# Patient Record
Sex: FEMALE | Race: WHITE | NOT HISPANIC OR LATINO | URBAN - METROPOLITAN AREA
[De-identification: names, ages, dates, MRNs, and addresses within clinical notes are randomized per-mention and may not be internally consistent; named-entity substitution may affect disease eponyms.]

---

## 2017-01-31 RX ORDER — SIMVASTATIN 20 MG/1
0 TABLET, FILM COATED ORAL
Qty: 90 | Refills: 0 | COMMUNITY
Start: 2017-01-31

## 2017-02-07 ENCOUNTER — OUTPATIENT (OUTPATIENT)
Dept: OUTPATIENT SERVICES | Facility: HOSPITAL | Age: 62
LOS: 1 days | End: 2017-02-07
Payer: COMMERCIAL

## 2017-02-07 VITALS
SYSTOLIC BLOOD PRESSURE: 110 MMHG | RESPIRATION RATE: 16 BRPM | TEMPERATURE: 98 F | WEIGHT: 195.11 LBS | HEART RATE: 72 BPM | HEIGHT: 67 IN | DIASTOLIC BLOOD PRESSURE: 70 MMHG

## 2017-02-07 DIAGNOSIS — G56.00 CARPAL TUNNEL SYNDROME, UNSPECIFIED UPPER LIMB: Chronic | ICD-10-CM

## 2017-02-07 DIAGNOSIS — S83.282D OTHER TEAR OF LATERAL MENISCUS, CURRENT INJURY, LEFT KNEE, SUBSEQUENT ENCOUNTER: ICD-10-CM

## 2017-02-07 DIAGNOSIS — Z01.818 ENCOUNTER FOR OTHER PREPROCEDURAL EXAMINATION: ICD-10-CM

## 2017-02-07 DIAGNOSIS — Z98.890 OTHER SPECIFIED POSTPROCEDURAL STATES: Chronic | ICD-10-CM

## 2017-02-07 DIAGNOSIS — Z90.710 ACQUIRED ABSENCE OF BOTH CERVIX AND UTERUS: Chronic | ICD-10-CM

## 2017-02-07 DIAGNOSIS — Z90.89 ACQUIRED ABSENCE OF OTHER ORGANS: Chronic | ICD-10-CM

## 2017-02-07 DIAGNOSIS — S83.519A SPRAIN OF ANTERIOR CRUCIATE LIGAMENT OF UNSPECIFIED KNEE, INITIAL ENCOUNTER: Chronic | ICD-10-CM

## 2017-02-07 DIAGNOSIS — Z41.1 ENCOUNTER FOR COSMETIC SURGERY: Chronic | ICD-10-CM

## 2017-02-07 LAB
ANION GAP SERPL CALC-SCNC: 12 MMOL/L — SIGNIFICANT CHANGE UP (ref 5–17)
BUN SERPL-MCNC: 11 MG/DL — SIGNIFICANT CHANGE UP (ref 7–23)
CALCIUM SERPL-MCNC: 8.8 MG/DL — SIGNIFICANT CHANGE UP (ref 8.5–10.1)
CHLORIDE SERPL-SCNC: 106 MMOL/L — SIGNIFICANT CHANGE UP (ref 96–108)
CO2 SERPL-SCNC: 24 MMOL/L — SIGNIFICANT CHANGE UP (ref 22–31)
CREAT SERPL-MCNC: 0.76 MG/DL — SIGNIFICANT CHANGE UP (ref 0.5–1.3)
GLUCOSE SERPL-MCNC: 128 MG/DL — HIGH (ref 70–99)
POTASSIUM SERPL-MCNC: 3.4 MMOL/L — LOW (ref 3.5–5.3)
POTASSIUM SERPL-SCNC: 3.4 MMOL/L — LOW (ref 3.5–5.3)
SODIUM SERPL-SCNC: 142 MMOL/L — SIGNIFICANT CHANGE UP (ref 135–145)

## 2017-02-07 PROCEDURE — G0463: CPT

## 2017-02-07 PROCEDURE — 80048 BASIC METABOLIC PNL TOTAL CA: CPT

## 2017-02-07 NOTE — H&P PST ADULT - NSANTHOSAYNRD_GEN_A_CORE
No. FRANK screening performed.  STOP BANG Legend: 0-2 = LOW Risk; 3-4 = INTERMEDIATE Risk; 5-8 = HIGH Risk

## 2017-02-07 NOTE — H&P PST ADULT - FAMILY HISTORY
Mother  Still living? Yes, Estimated age: 88  Family history of dementia, Age at diagnosis: Age Unknown  Family history of emphysema, Age at diagnosis: Age Unknown  Family history of arthritis, Age at diagnosis: Age Unknown     Father  Still living? Yes, Estimated age: 89  Family history of acute congestive heart failure, Age at diagnosis: Age Unknown  Family history of type 2 diabetes mellitus, Age at diagnosis: Age Unknown  Family history of prostate cancer in father, Age at diagnosis: Age Unknown  Family history of neuropathy, Age at diagnosis: Age Unknown     Sibling  Still living? Yes, Estimated age: Age Unknown  Family history of neuropathy, Age at diagnosis: Age Unknown

## 2017-02-07 NOTE — H&P PST ADULT - PMH
Endometrial cancer  2012  Hyperlipidemia    Other tear of lateral meniscus of left knee as current injury, subsequent encounter    Vitamin D deficiency

## 2017-02-07 NOTE — H&P PST ADULT - PROBLEM SELECTOR PLAN 1
62 yo female scheduled for Left Knee Arthroscopy on 2/10/17 with Dr Byrnes  Check labs CBC CMP on chart   BMP repeated here   Medical Clearance  Hibiclens and Preop instructions were given  patient has stopped osteo flex and fish oil   morning of surgery no medications  patient verbalizes understanding of instructions

## 2017-02-07 NOTE — H&P PST ADULT - HISTORY OF PRESENT ILLNESS
62 yo female scheduled for left knee Arthroscopy on 2/10/17 with Dr Byrnes  In May 2016 patient twisted the left knee avoiding a fall.  Patient has had  one Cortisone injection which did not help. 60 yo female scheduled for left knee Arthroscopy on 2/10/17 with Dr Byrnes.  In May 2016 patient twisted the left knee avoiding a fall.  Patient has had  one Cortisone injection which did not help.

## 2017-02-08 RX ORDER — ACETAMINOPHEN 500 MG
650 TABLET ORAL ONCE
Qty: 0 | Refills: 0 | Status: COMPLETED | OUTPATIENT
Start: 2017-02-10 | End: 2017-02-10

## 2017-02-09 ENCOUNTER — RESULT REVIEW (OUTPATIENT)
Age: 62
End: 2017-02-09

## 2017-02-10 ENCOUNTER — TRANSCRIPTION ENCOUNTER (OUTPATIENT)
Age: 62
End: 2017-02-10

## 2017-02-10 ENCOUNTER — OUTPATIENT (OUTPATIENT)
Dept: OUTPATIENT SERVICES | Facility: HOSPITAL | Age: 62
LOS: 1 days | Discharge: HOME | End: 2017-02-10
Payer: COMMERCIAL

## 2017-02-10 VITALS
HEART RATE: 71 BPM | OXYGEN SATURATION: 96 % | SYSTOLIC BLOOD PRESSURE: 118 MMHG | HEIGHT: 67 IN | RESPIRATION RATE: 16 BRPM | DIASTOLIC BLOOD PRESSURE: 88 MMHG | WEIGHT: 195.11 LBS | TEMPERATURE: 98 F

## 2017-02-10 VITALS
TEMPERATURE: 98 F | HEART RATE: 71 BPM | RESPIRATION RATE: 16 BRPM | OXYGEN SATURATION: 96 % | SYSTOLIC BLOOD PRESSURE: 118 MMHG | DIASTOLIC BLOOD PRESSURE: 72 MMHG

## 2017-02-10 DIAGNOSIS — S83.282D OTHER TEAR OF LATERAL MENISCUS, CURRENT INJURY, LEFT KNEE, SUBSEQUENT ENCOUNTER: ICD-10-CM

## 2017-02-10 DIAGNOSIS — Z90.710 ACQUIRED ABSENCE OF BOTH CERVIX AND UTERUS: Chronic | ICD-10-CM

## 2017-02-10 DIAGNOSIS — Z98.890 OTHER SPECIFIED POSTPROCEDURAL STATES: Chronic | ICD-10-CM

## 2017-02-10 DIAGNOSIS — Z01.818 ENCOUNTER FOR OTHER PREPROCEDURAL EXAMINATION: ICD-10-CM

## 2017-02-10 DIAGNOSIS — Z41.1 ENCOUNTER FOR COSMETIC SURGERY: Chronic | ICD-10-CM

## 2017-02-10 DIAGNOSIS — S83.519A SPRAIN OF ANTERIOR CRUCIATE LIGAMENT OF UNSPECIFIED KNEE, INITIAL ENCOUNTER: Chronic | ICD-10-CM

## 2017-02-10 DIAGNOSIS — G56.00 CARPAL TUNNEL SYNDROME, UNSPECIFIED UPPER LIMB: Chronic | ICD-10-CM

## 2017-02-10 DIAGNOSIS — Z90.89 ACQUIRED ABSENCE OF OTHER ORGANS: Chronic | ICD-10-CM

## 2017-02-10 PROCEDURE — 29880 ARTHRS KNE SRG MNISECTMY M&L: CPT | Mod: LT

## 2017-02-10 PROCEDURE — 88304 TISSUE EXAM BY PATHOLOGIST: CPT

## 2017-02-10 PROCEDURE — 88304 TISSUE EXAM BY PATHOLOGIST: CPT | Mod: 26

## 2017-02-10 RX ORDER — OXYCODONE HYDROCHLORIDE 5 MG/1
5 TABLET ORAL ONCE
Qty: 0 | Refills: 0 | Status: DISCONTINUED | OUTPATIENT
Start: 2017-02-10 | End: 2017-02-10

## 2017-02-10 RX ORDER — SODIUM CHLORIDE 9 MG/ML
1000 INJECTION, SOLUTION INTRAVENOUS
Qty: 0 | Refills: 0 | Status: DISCONTINUED | OUTPATIENT
Start: 2017-02-10 | End: 2017-02-10

## 2017-02-10 RX ORDER — KETOROLAC TROMETHAMINE 30 MG/ML
30 SYRINGE (ML) INJECTION ONCE
Qty: 0 | Refills: 0 | Status: DISCONTINUED | OUTPATIENT
Start: 2017-02-10 | End: 2017-02-10

## 2017-02-10 RX ORDER — CEFAZOLIN SODIUM 1 G
2000 VIAL (EA) INJECTION ONCE
Qty: 0 | Refills: 0 | Status: COMPLETED | OUTPATIENT
Start: 2017-02-10 | End: 2017-02-10

## 2017-02-10 RX ORDER — ACETAMINOPHEN 500 MG
650 TABLET ORAL ONCE
Qty: 0 | Refills: 0 | Status: DISCONTINUED | OUTPATIENT
Start: 2017-02-10 | End: 2017-02-10

## 2017-02-10 RX ORDER — HYDROMORPHONE HYDROCHLORIDE 2 MG/ML
0.5 INJECTION INTRAMUSCULAR; INTRAVENOUS; SUBCUTANEOUS
Qty: 0 | Refills: 0 | Status: DISCONTINUED | OUTPATIENT
Start: 2017-02-10 | End: 2017-02-10

## 2017-02-10 RX ORDER — ASPIRIN/CALCIUM CARB/MAGNESIUM 324 MG
1 TABLET ORAL
Qty: 14 | Refills: 0 | OUTPATIENT
Start: 2017-02-10 | End: 2017-02-24

## 2017-02-10 RX ADMIN — SODIUM CHLORIDE 75 MILLILITER(S): 9 INJECTION, SOLUTION INTRAVENOUS at 06:51

## 2017-02-10 RX ADMIN — Medication 650 MILLIGRAM(S): at 06:59

## 2017-02-10 RX ADMIN — Medication 650 MILLIGRAM(S): at 07:00

## 2017-02-10 RX ADMIN — Medication 30 MILLIGRAM(S): at 09:15

## 2017-02-10 RX ADMIN — Medication 30 MILLIGRAM(S): at 09:45

## 2017-02-10 NOTE — ASU DISCHARGE PLAN (ADULT/PEDIATRIC). - NOTIFY
Fever greater than 101/Bleeding that does not stop/Pain not relieved by Medications/Persistent Nausea and Vomiting/Numbness, color, or temperature change to extremity

## 2017-02-10 NOTE — ASU PATIENT PROFILE, ADULT - PSH
ACL (anterior cruciate ligament) tear  reconstruction 5/97  Admission for breast augmentation  2015  Carpal tunnel syndrome  1983 1985  H/O abdominal hysterectomy    H/O abdominoplasty  12/98  H/O hemorrhoidectomy  2006  S/P tonsillectomy  1982

## 2017-02-10 NOTE — BRIEF OPERATIVE NOTE - POST-OP DX
Bucket handle tear of medial meniscus of left knee, unspecified whether old or current tear, initial encounter  02/10/2017    Active  Jefe Francisco  Complex tear of lateral meniscus of left knee, unspecified whether old or current tear, initial encounter  02/10/2017    Active  Jefe Francisco

## 2017-02-10 NOTE — ASU DISCHARGE PLAN (ADULT/PEDIATRIC). - MEDICATION SUMMARY - MEDICATIONS TO TAKE
I will START or STAY ON the medications listed below when I get home from the hospital:    Osteoflex  --     -- Indication: For home med    Vicodin 5 mg-300 mg oral tablet  -- 1 tab(s) by mouth every 4-6 hours as needed for pain  -- Indication: For pain prn    aspirin 81 mg oral tablet  -- 1 tab(s) by mouth once a day for DVT PPx. Take for 2 weeks. MDD:1 tab  -- Take with food or milk.    -- Indication: For DVT PPx    SIMVASTATIN  TAB 20MG  -- Indication: For home med    Fish Oil oral capsule  --  by mouth   -- Indication: For home med    multivitamin  --     -- Indication: For home med    Vitamin D3 1000 intl units oral capsule  --  by mouth   -- Indication: For home med

## 2017-02-10 NOTE — ASU DISCHARGE PLAN (ADULT/PEDIATRIC). - SPECIAL INSTRUCTIONS
please call office for follow up appointment; please rest ice and elevate affected knee; WBAT; keep dressing clean dry intact; Aspirin 81mg for 2 weeks for DVT prophylaxis. Vicodin prescription sent to pharmacy from the office.

## 2017-02-13 LAB — SURGICAL PATHOLOGY FINAL REPORT - CH: SIGNIFICANT CHANGE UP

## 2017-02-16 DIAGNOSIS — S83.272A COMPLEX TEAR OF LATERAL MENISCUS, CURRENT INJURY, LEFT KNEE, INITIAL ENCOUNTER: ICD-10-CM

## 2017-02-16 DIAGNOSIS — M65.862 OTHER SYNOVITIS AND TENOSYNOVITIS, LEFT LOWER LEG: ICD-10-CM

## 2017-02-16 DIAGNOSIS — Z91.018 ALLERGY TO OTHER FOODS: ICD-10-CM

## 2017-02-16 DIAGNOSIS — M17.9 OSTEOARTHRITIS OF KNEE, UNSPECIFIED: ICD-10-CM

## 2017-02-16 DIAGNOSIS — M25.562 PAIN IN LEFT KNEE: ICD-10-CM
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
## 2017-02-16 DIAGNOSIS — Z91.040 LATEX ALLERGY STATUS: ICD-10-CM
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
## 2017-02-16 DIAGNOSIS — Z91.048 OTHER NONMEDICINAL SUBSTANCE ALLERGY STATUS: ICD-10-CM

## 2017-02-16 DIAGNOSIS — S83.212A BUCKET-HANDLE TEAR OF MEDIAL MENISCUS, CURRENT INJURY, LEFT KNEE, INITIAL ENCOUNTER: ICD-10-CM

## 2017-02-16 DIAGNOSIS — E78.5 HYPERLIPIDEMIA, UNSPECIFIED: ICD-10-CM

## 2017-02-16 DIAGNOSIS — E78.00 PURE HYPERCHOLESTEROLEMIA, UNSPECIFIED: ICD-10-CM
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
## 2017-03-06 ENCOUNTER — APPOINTMENT (OUTPATIENT)
Dept: INFECTIOUS DISEASE | Facility: CLINIC | Age: 62
End: 2017-03-06

## 2017-05-15 ENCOUNTER — RX RENEWAL (OUTPATIENT)
Age: 62
End: 2017-05-15

## 2017-05-15 ENCOUNTER — APPOINTMENT (OUTPATIENT)
Dept: DERMATOLOGY | Facility: CLINIC | Age: 62
End: 2017-05-15

## 2017-05-15 VITALS
HEIGHT: 67 IN | WEIGHT: 176 LBS | SYSTOLIC BLOOD PRESSURE: 130 MMHG | BODY MASS INDEX: 27.62 KG/M2 | DIASTOLIC BLOOD PRESSURE: 84 MMHG

## 2017-05-15 DIAGNOSIS — L30.9 DERMATITIS, UNSPECIFIED: ICD-10-CM

## 2017-05-15 DIAGNOSIS — L57.0 ACTINIC KERATOSIS: ICD-10-CM

## 2017-05-15 RX ORDER — ASPIRIN ENTERIC COATED TABLETS 81 MG 81 MG/1
81 TABLET, DELAYED RELEASE ORAL
Qty: 14 | Refills: 0 | Status: DISCONTINUED | COMMUNITY
Start: 2017-02-10

## 2017-05-15 RX ORDER — SIMVASTATIN 20 MG/1
20 TABLET, FILM COATED ORAL
Qty: 14 | Refills: 0 | Status: ACTIVE | COMMUNITY
Start: 2017-01-18

## 2017-06-05 ENCOUNTER — APPOINTMENT (OUTPATIENT)
Dept: DERMATOLOGY | Facility: CLINIC | Age: 62
End: 2017-06-05

## 2017-09-11 ENCOUNTER — APPOINTMENT (OUTPATIENT)
Dept: DERMATOLOGY | Facility: CLINIC | Age: 62
End: 2017-09-11

## 2017-10-02 ENCOUNTER — APPOINTMENT (OUTPATIENT)
Dept: DERMATOLOGY | Facility: CLINIC | Age: 62
End: 2017-10-02
Payer: COMMERCIAL

## 2017-10-02 VITALS — SYSTOLIC BLOOD PRESSURE: 120 MMHG | DIASTOLIC BLOOD PRESSURE: 74 MMHG

## 2017-10-02 DIAGNOSIS — I78.1 NEVUS, NON-NEOPLASTIC: ICD-10-CM

## 2017-10-02 DIAGNOSIS — L85.1 ACQUIRED KERATOSIS [KERATODERMA] PALMARIS ET PLANTARIS: ICD-10-CM

## 2017-10-02 DIAGNOSIS — Z12.83 ENCOUNTER FOR SCREENING FOR MALIGNANT NEOPLASM OF SKIN: ICD-10-CM

## 2017-10-02 DIAGNOSIS — L73.9 FOLLICULAR DISORDER, UNSPECIFIED: ICD-10-CM

## 2017-10-02 PROCEDURE — 99214 OFFICE O/P EST MOD 30 MIN: CPT | Mod: GC

## 2017-10-10 ENCOUNTER — MEDICATION RENEWAL (OUTPATIENT)
Age: 62
End: 2017-10-10

## 2017-10-18 ENCOUNTER — MEDICATION RENEWAL (OUTPATIENT)
Age: 62
End: 2017-10-18

## 2017-11-28 ENCOUNTER — OUTPATIENT (OUTPATIENT)
Dept: OUTPATIENT SERVICES | Facility: HOSPITAL | Age: 62
LOS: 1 days | End: 2017-11-28
Payer: COMMERCIAL

## 2017-11-28 VITALS
SYSTOLIC BLOOD PRESSURE: 144 MMHG | HEART RATE: 60 BPM | DIASTOLIC BLOOD PRESSURE: 95 MMHG | RESPIRATION RATE: 18 BRPM | TEMPERATURE: 98 F | WEIGHT: 192.9 LBS | OXYGEN SATURATION: 96 % | HEIGHT: 67 IN

## 2017-11-28 DIAGNOSIS — Z90.89 ACQUIRED ABSENCE OF OTHER ORGANS: Chronic | ICD-10-CM

## 2017-11-28 DIAGNOSIS — Z01.812 ENCOUNTER FOR PREPROCEDURAL LABORATORY EXAMINATION: ICD-10-CM

## 2017-11-28 DIAGNOSIS — G56.00 CARPAL TUNNEL SYNDROME, UNSPECIFIED UPPER LIMB: Chronic | ICD-10-CM

## 2017-11-28 DIAGNOSIS — Z90.710 ACQUIRED ABSENCE OF BOTH CERVIX AND UTERUS: Chronic | ICD-10-CM

## 2017-11-28 DIAGNOSIS — S83.519A SPRAIN OF ANTERIOR CRUCIATE LIGAMENT OF UNSPECIFIED KNEE, INITIAL ENCOUNTER: Chronic | ICD-10-CM

## 2017-11-28 DIAGNOSIS — Z98.890 OTHER SPECIFIED POSTPROCEDURAL STATES: Chronic | ICD-10-CM

## 2017-11-28 DIAGNOSIS — Z41.1 ENCOUNTER FOR COSMETIC SURGERY: Chronic | ICD-10-CM

## 2017-11-28 DIAGNOSIS — M25.561 PAIN IN RIGHT KNEE: ICD-10-CM

## 2017-11-28 DIAGNOSIS — S83.241D OTHER TEAR OF MEDIAL MENISCUS, CURRENT INJURY, RIGHT KNEE, SUBSEQUENT ENCOUNTER: ICD-10-CM

## 2017-11-28 LAB
ANION GAP SERPL CALC-SCNC: 6 MMOL/L — SIGNIFICANT CHANGE UP (ref 5–17)
BUN SERPL-MCNC: 16 MG/DL — SIGNIFICANT CHANGE UP (ref 7–23)
CALCIUM SERPL-MCNC: 9.4 MG/DL — SIGNIFICANT CHANGE UP (ref 8.5–10.1)
CHLORIDE SERPL-SCNC: 104 MMOL/L — SIGNIFICANT CHANGE UP (ref 96–108)
CO2 SERPL-SCNC: 31 MMOL/L — SIGNIFICANT CHANGE UP (ref 22–31)
CREAT SERPL-MCNC: 0.78 MG/DL — SIGNIFICANT CHANGE UP (ref 0.5–1.3)
GLUCOSE SERPL-MCNC: 89 MG/DL — SIGNIFICANT CHANGE UP (ref 70–99)
HCT VFR BLD CALC: 45.7 % — HIGH (ref 34.5–45)
HGB BLD-MCNC: 15 G/DL — SIGNIFICANT CHANGE UP (ref 11.5–15.5)
MCHC RBC-ENTMCNC: 28.7 PG — SIGNIFICANT CHANGE UP (ref 27–34)
MCHC RBC-ENTMCNC: 32.9 GM/DL — SIGNIFICANT CHANGE UP (ref 32–36)
MCV RBC AUTO: 87.4 FL — SIGNIFICANT CHANGE UP (ref 80–100)
PLATELET # BLD AUTO: 201 K/UL — SIGNIFICANT CHANGE UP (ref 150–400)
POTASSIUM SERPL-MCNC: 4.1 MMOL/L — SIGNIFICANT CHANGE UP (ref 3.5–5.3)
POTASSIUM SERPL-SCNC: 4.1 MMOL/L — SIGNIFICANT CHANGE UP (ref 3.5–5.3)
RBC # BLD: 5.23 M/UL — HIGH (ref 3.8–5.2)
RBC # FLD: 11.1 % — SIGNIFICANT CHANGE UP (ref 10.3–14.5)
SODIUM SERPL-SCNC: 141 MMOL/L — SIGNIFICANT CHANGE UP (ref 135–145)
WBC # BLD: 4.9 K/UL — SIGNIFICANT CHANGE UP (ref 3.8–10.5)
WBC # FLD AUTO: 4.9 K/UL — SIGNIFICANT CHANGE UP (ref 3.8–10.5)

## 2017-11-28 PROCEDURE — 93010 ELECTROCARDIOGRAM REPORT: CPT

## 2017-11-28 PROCEDURE — G0463: CPT

## 2017-11-28 PROCEDURE — 93005 ELECTROCARDIOGRAM TRACING: CPT

## 2017-11-28 PROCEDURE — 80048 BASIC METABOLIC PNL TOTAL CA: CPT

## 2017-11-28 PROCEDURE — 85027 COMPLETE CBC AUTOMATED: CPT

## 2017-11-28 NOTE — H&P PST ADULT - ASSESSMENT
This is a 61 yo F with chronic pain to right knee since 10/14. Reports going for hiking prior to onset of symptoms and noticed having pain 5-6/10 upon walking. Was seen at Drs office and MRI was done and was referred for surgery. Labs done. Instructions given

## 2017-11-28 NOTE — H&P PST ADULT - PSH
ACL (anterior cruciate ligament) tear  reconstruction 5/97  Admission for breast augmentation  2015  Carpal tunnel syndrome  1983 1985  H/O abdominal hysterectomy    H/O abdominoplasty  12/98  H/O arthroscopy of knee  left  H/O hemorrhoidectomy  2006  S/P tonsillectomy  1982

## 2017-11-28 NOTE — H&P PST ADULT - HISTORY OF PRESENT ILLNESS
This is a 63 yo F with chronic pain to right knee since 10/14. Unknown etiology. Reports going for hiking prior to onset of symptoms and noticed having pain 5-6/10 upon walking. Was seen at Drs office and MRI was done and was referred for surgery. Currently pain -free and here for pre-surgical testing. This is a 63 yo F with chronic pain to right knee since 10/14. Non-Traumatic. Reports going for hiking prior to onset of symptoms and noticed having pain 5-6/10 upon walking. Was seen at Drs office and given cortisone injection few months ago, continued to have pain and MRI was done and was referred for surgery. Currently pain -free and here for pre-surgical testing.

## 2017-11-28 NOTE — H&P PST ADULT - MUSCULOSKELETAL
details… detailed exam no joint swelling/normal/ROM intact/no joint erythema/normal strength/no joint warmth/no calf tenderness

## 2017-12-07 RX ORDER — SODIUM CHLORIDE 9 MG/ML
1000 INJECTION, SOLUTION INTRAVENOUS
Qty: 0 | Refills: 0 | Status: DISCONTINUED | OUTPATIENT
Start: 2017-12-08 | End: 2017-12-08

## 2017-12-08 ENCOUNTER — TRANSCRIPTION ENCOUNTER (OUTPATIENT)
Age: 62
End: 2017-12-08

## 2017-12-08 ENCOUNTER — RESULT REVIEW (OUTPATIENT)
Age: 62
End: 2017-12-08

## 2017-12-08 ENCOUNTER — OUTPATIENT (OUTPATIENT)
Dept: OUTPATIENT SERVICES | Facility: HOSPITAL | Age: 62
LOS: 1 days | End: 2017-12-08
Payer: COMMERCIAL

## 2017-12-08 VITALS
HEIGHT: 67 IN | RESPIRATION RATE: 16 BRPM | SYSTOLIC BLOOD PRESSURE: 116 MMHG | DIASTOLIC BLOOD PRESSURE: 66 MMHG | OXYGEN SATURATION: 95 % | HEART RATE: 68 BPM | TEMPERATURE: 98 F | WEIGHT: 190.04 LBS

## 2017-12-08 VITALS
OXYGEN SATURATION: 100 % | HEART RATE: 65 BPM | DIASTOLIC BLOOD PRESSURE: 51 MMHG | RESPIRATION RATE: 14 BRPM | SYSTOLIC BLOOD PRESSURE: 104 MMHG

## 2017-12-08 DIAGNOSIS — Z98.890 OTHER SPECIFIED POSTPROCEDURAL STATES: Chronic | ICD-10-CM

## 2017-12-08 DIAGNOSIS — Z41.1 ENCOUNTER FOR COSMETIC SURGERY: Chronic | ICD-10-CM

## 2017-12-08 DIAGNOSIS — Z90.89 ACQUIRED ABSENCE OF OTHER ORGANS: Chronic | ICD-10-CM

## 2017-12-08 DIAGNOSIS — S83.519A SPRAIN OF ANTERIOR CRUCIATE LIGAMENT OF UNSPECIFIED KNEE, INITIAL ENCOUNTER: Chronic | ICD-10-CM

## 2017-12-08 DIAGNOSIS — Z90.710 ACQUIRED ABSENCE OF BOTH CERVIX AND UTERUS: Chronic | ICD-10-CM

## 2017-12-08 DIAGNOSIS — G56.00 CARPAL TUNNEL SYNDROME, UNSPECIFIED UPPER LIMB: Chronic | ICD-10-CM

## 2017-12-08 DIAGNOSIS — S83.241D OTHER TEAR OF MEDIAL MENISCUS, CURRENT INJURY, RIGHT KNEE, SUBSEQUENT ENCOUNTER: ICD-10-CM

## 2017-12-08 PROCEDURE — 88304 TISSUE EXAM BY PATHOLOGIST: CPT

## 2017-12-08 PROCEDURE — 88304 TISSUE EXAM BY PATHOLOGIST: CPT | Mod: 26

## 2017-12-08 PROCEDURE — 29880 ARTHRS KNE SRG MNISECTMY M&L: CPT | Mod: RT

## 2017-12-08 RX ORDER — OXYCODONE HYDROCHLORIDE 5 MG/1
5 TABLET ORAL ONCE
Qty: 0 | Refills: 0 | Status: DISCONTINUED | OUTPATIENT
Start: 2017-12-08 | End: 2017-12-08

## 2017-12-08 RX ORDER — ACETAMINOPHEN 500 MG
1000 TABLET ORAL ONCE
Qty: 0 | Refills: 0 | Status: COMPLETED | OUTPATIENT
Start: 2017-12-08 | End: 2017-12-08

## 2017-12-08 RX ORDER — HYDROMORPHONE HYDROCHLORIDE 2 MG/ML
0.5 INJECTION INTRAMUSCULAR; INTRAVENOUS; SUBCUTANEOUS
Qty: 0 | Refills: 0 | Status: DISCONTINUED | OUTPATIENT
Start: 2017-12-08 | End: 2017-12-08

## 2017-12-08 RX ORDER — CEFAZOLIN SODIUM 1 G
2000 VIAL (EA) INJECTION ONCE
Qty: 0 | Refills: 0 | Status: COMPLETED | OUTPATIENT
Start: 2017-12-08 | End: 2017-12-08

## 2017-12-08 RX ORDER — SODIUM CHLORIDE 9 MG/ML
1000 INJECTION, SOLUTION INTRAVENOUS
Qty: 0 | Refills: 0 | Status: DISCONTINUED | OUTPATIENT
Start: 2017-12-08 | End: 2017-12-08

## 2017-12-08 RX ORDER — OMEGA-3 ACID ETHYL ESTERS 1 G
0 CAPSULE ORAL
Qty: 0 | Refills: 0 | COMMUNITY

## 2017-12-08 RX ORDER — ASPIRIN/CALCIUM CARB/MAGNESIUM 324 MG
1 TABLET ORAL
Qty: 30 | Refills: 0 | OUTPATIENT
Start: 2017-12-08 | End: 2018-01-07

## 2017-12-08 RX ORDER — CHOLECALCIFEROL (VITAMIN D3) 125 MCG
0 CAPSULE ORAL
Qty: 0 | Refills: 0 | COMMUNITY

## 2017-12-08 RX ADMIN — HYDROMORPHONE HYDROCHLORIDE 0.5 MILLIGRAM(S): 2 INJECTION INTRAMUSCULAR; INTRAVENOUS; SUBCUTANEOUS at 11:06

## 2017-12-08 RX ADMIN — HYDROMORPHONE HYDROCHLORIDE 0.5 MILLIGRAM(S): 2 INJECTION INTRAMUSCULAR; INTRAVENOUS; SUBCUTANEOUS at 10:56

## 2017-12-08 RX ADMIN — SODIUM CHLORIDE 75 MILLILITER(S): 9 INJECTION, SOLUTION INTRAVENOUS at 08:25

## 2017-12-08 NOTE — BRIEF OPERATIVE NOTE - OPERATION/FINDINGS
right knee arthroscopy, medial meniscus tear, chondromalacia, discoid lateral meniscus, partial medial menisectomy, chondroplasty, debdridement, saucerization of lateral meniscus  see op report

## 2017-12-08 NOTE — ASU DISCHARGE PLAN (ADULT/PEDIATRIC). - NOTIFY
Swelling that continues/Bleeding that does not stop/Numbness, color, or temperature change to extremity/Fever greater than 101 Swelling that continues/Numbness, tingling/Numbness, color, or temperature change to extremity/Bleeding that does not stop/Pain not relieved by Medications/Fever greater than 101

## 2017-12-08 NOTE — BRIEF OPERATIVE NOTE - PROCEDURE
<<-----Click on this checkbox to enter Procedure Knee arthroscopy, right  12/08/2017    Active  [facility]

## 2017-12-08 NOTE — ASU DISCHARGE PLAN (ADULT/PEDIATRIC). - MEDICATION SUMMARY - MEDICATIONS TO TAKE
I will START or STAY ON the medications listed below when I get home from the hospital:    Osteoflex  --     -- Indication: For home med    aspirin 81 mg oral tablet  -- 1 tab(s) by mouth once a day MDD:1. do not skip doses  -- Take with food or milk.    -- Indication: For dvt ppx    SIMVASTATIN  TAB 20MG  -- Indication: For home med    Fish Oil oral capsule  --  by mouth   -- Indication: For home med    multivitamin  --     -- Indication: For home med    Vitamin D3 1000 intl units oral capsule  --  by mouth   -- Indication: For home med

## 2017-12-08 NOTE — ASU DISCHARGE PLAN (ADULT/PEDIATRIC). - ASU FOLLOWUP
911 or go to the nearest Emergency Room 911 or go to the nearest Emergency Room/[phone] ask for nursing supervisor

## 2017-12-11 LAB — SURGICAL PATHOLOGY FINAL REPORT - CH: SIGNIFICANT CHANGE UP

## 2018-09-04 NOTE — H&P PST ADULT - PRIMARY CARE PROVIDER
----- Message from Clari Wren sent at 9/4/2018  4:05 PM EDT -----  Contact: [phone]  Pt has some swelling from after her surgery .  
Pt called stating she has a fluid filled pouch under left breast where she previously had surgery. States area is painful and swollen and has been there for about 2 weeks. Denies fever. Reviewed with Dr. Valenzuela. Per Dr. Valenzuela, instructed pt to contact surgeon about this. Pt v/u.   
Dr Reyes

## 2019-10-01 PROBLEM — E78.5 HYPERLIPIDEMIA, UNSPECIFIED: Chronic | Status: ACTIVE | Noted: 2017-02-07

## 2019-10-01 PROBLEM — S83.282D OTHER TEAR OF LATERAL MENISCUS, CURRENT INJURY, LEFT KNEE, SUBSEQUENT ENCOUNTER: Chronic | Status: ACTIVE | Noted: 2017-02-07

## 2019-10-01 PROBLEM — E55.9 VITAMIN D DEFICIENCY, UNSPECIFIED: Chronic | Status: ACTIVE | Noted: 2017-02-07

## 2019-10-01 PROBLEM — C54.1 MALIGNANT NEOPLASM OF ENDOMETRIUM: Chronic | Status: ACTIVE | Noted: 2017-02-07

## 2019-11-02 NOTE — ASU PATIENT PROFILE, ADULT - MEDICATION HERBAL REMEDIES, PROFILE
**ATTENDING ADDENDUM (Dr. Marvin Ross): CODE STROKE ACTIVATION called at triage for right-sided facial droop. NO evidence of cerebrovascular accident or transient ischemic attack at this time. Evaluated by me at triage desk; NO evidence of motor weakness except for CN 7 (facial nerve) palsy. Agree with discontinuation of CODE STROKE ACTIVATION at this time. NO drift, NO cerebellar findings, NO focal or generalized weakness except in distribution of right facial nerve. Will continue to observe and monitor closely. Anticipatory guidance provided to patient and family at time of initial evaluation. no **ATTENDING ADDENDUM (Dr. Marvin Ross): patient serially evaluated throughout ED course. NO acute deterioration up to this time in the ED. NO evidence of acute cerebrovascular accident, transient ischemic attack, atrial fibrillation, arrhythmia, mass/tumor, serious bacterial infection or sepsis/severe sepsis or other worrisome etiology for clinically-diagnosed Bell's palsy. NO indication for emergent Stroke Team or Neurology evaluation at this time. Extensive anticipatory guidance provided to patient +/or family member(s). Agree with discharge home with close outpatient followup with ophthalmology and neurology. Agree with medications as prescribed: valacyclovir, tears naturale, lacri-lube, and prednisone taper.

## 2022-06-09 ENCOUNTER — APPOINTMENT (OUTPATIENT)
Dept: ORTHOPEDIC SURGERY | Facility: CLINIC | Age: 67
End: 2022-06-09
Payer: MEDICARE

## 2022-06-09 ENCOUNTER — APPOINTMENT (OUTPATIENT)
Dept: ORTHOPEDIC SURGERY | Facility: CLINIC | Age: 67
End: 2022-06-09

## 2022-06-09 VITALS — HEIGHT: 67 IN | WEIGHT: 176 LBS | BODY MASS INDEX: 27.62 KG/M2

## 2022-06-09 DIAGNOSIS — M71.22 SYNOVIAL CYST OF POPLITEAL SPACE [BAKER], LEFT KNEE: ICD-10-CM

## 2022-06-09 DIAGNOSIS — M17.12 UNILATERAL PRIMARY OSTEOARTHRITIS, LEFT KNEE: ICD-10-CM

## 2022-06-09 PROCEDURE — 73562 X-RAY EXAM OF KNEE 3: CPT | Mod: LT

## 2022-06-09 PROCEDURE — 99203 OFFICE O/P NEW LOW 30 MIN: CPT

## 2022-06-09 RX ORDER — BETAMETHASONE DIPROPIONATE 0.5 MG/G
0.05 OINTMENT TOPICAL
Qty: 1 | Refills: 3 | Status: COMPLETED | COMMUNITY
Start: 2017-05-15 | End: 2022-06-09

## 2022-06-09 RX ORDER — AMOXICILLIN AND CLAVULANATE POTASSIUM 875; 125 MG/1; MG/1
875-125 TABLET, COATED ORAL
Qty: 20 | Refills: 0 | Status: COMPLETED | COMMUNITY
Start: 2017-04-12 | End: 2022-06-09

## 2022-06-09 RX ORDER — CLINDAMYCIN PHOSPHATE 10 MG/ML
1 SOLUTION TOPICAL TWICE DAILY
Qty: 1 | Refills: 2 | Status: COMPLETED | COMMUNITY
Start: 2017-10-02 | End: 2022-06-09

## 2022-06-09 RX ORDER — METHYLPREDNISOLONE 4 MG/1
4 TABLET ORAL
Qty: 21 | Refills: 0 | Status: COMPLETED | COMMUNITY
Start: 2017-04-21 | End: 2022-06-09

## 2022-06-09 RX ORDER — HALOBETASOL PROPIONATE 0.5 MG/G
0.05 CREAM TOPICAL TWICE DAILY
Qty: 60 | Refills: 0 | Status: COMPLETED | COMMUNITY
Start: 2017-05-15 | End: 2022-06-09

## 2022-06-09 RX ORDER — LEVOFLOXACIN 500 MG/1
500 TABLET, FILM COATED ORAL
Qty: 10 | Refills: 0 | Status: COMPLETED | COMMUNITY
Start: 2017-04-18 | End: 2022-06-09

## 2022-06-09 RX ORDER — ADAPALENE AND BENZOYL PEROXIDE 1; 25 MG/G; MG/G
0.1-2.5 GEL TOPICAL
Qty: 1 | Refills: 2 | Status: COMPLETED | COMMUNITY
Start: 2017-10-18 | End: 2022-06-09

## 2022-06-09 NOTE — REASON FOR VISIT
[FreeTextEntry2] : Mild occasional pain left knee with swelling left posterior knee over the past several months

## 2022-06-09 NOTE — DISCUSSION/SUMMARY
[de-identified] : I discussed possible aspiration of popliteal cyst and injection of cortisone.  Risks benefits and the alternatives were discussed.  She says she is not that symptomatic and does not want to do this.  She will notify me if she has any increased swelling or develops and knee increased pain\par To monitor for any lower leg congestion, swelling or pain in her calf and to notify me immediately.  I did explain to her that a DVT can be potentially life-threatening if this develops\par I discussed possible Euflexxa injections for her left knee and gave her a pamphlet. I discussed trying this if she has any increased knee pain  Risks benefits and the alternatives were discussed.\par Continue Osteo Bi-Flex\par \par Impression:\par Moderate to severe osteoarthritis left knee/popliteal cyst\par

## 2022-06-09 NOTE — HISTORY OF PRESENT ILLNESS
[Gradual] : gradual [2] : 2 [Dull/Aching] : dull/aching [Intermittent] : intermittent [Leisure] : leisure [Rest] : rest [Walking] : walking [Full time] : Work status: full time [de-identified] : Patient is a 66-year-old female with history of ACL reconstruction with allograft by Dr. Garfield Huffman may 1997.  She is very active.  She hikes on a regular basis.  Over the past several months she's had mild occasional pain in her left knee with activities.  She has had swelling in her left posterior knee region as well over the past several months.  Mild occasional pain in this region.  No calf pain or swelling.  She doesn't take medications.  She takes Osteo Bi-Flex [] : Post Surgical Visit: no [de-identified] : 4/26/2022 [de-identified] : Dr. Azar  [de-identified] : MRI [de-identified] : Perla Rutledge

## 2022-06-09 NOTE — PHYSICAL EXAM
[Normal Mood and Affect] : normal mood and affect [Able to Communicate] : able to communicate [Well Developed] : well developed [Well Nourished] : well nourished [Left] : left knee [5___] : quadriceps 5[unfilled]/5 [FreeTextEntry3] : Mild swelling popliteal region [FreeTextEntry8] : Slight tenderness popliteal region with palpable no mass and mild fluctuance [FreeTextEntry9] : Reviewed and interpreted.  Left knee AP standing, lateral, sunrise views-moderate to severe degenerative changes with narrowing the medial compartment on AP standing view, spurring patellofemoral joint.  Status post ACL reconstruction with interference screws distal femur and proximal tibia.  Staple proximal tibia [TWNoteComboBox7] : flexion 125 degrees [de-identified] : extension 0 degrees [] : no calf tenderness [2+] : dorsalis pedis pulse: 2+

## 2022-06-09 NOTE — DATA REVIEWED
[MRI] : MRI [Left] : left [Knee] : knee [I independently reviewed and interpreted images and report] : I independently reviewed and interpreted images and report [FreeTextEntry1] : MRI left knee done at [facility] imaging 5/4/22 was reviewed.  There are tricompartmental degenerative changes.  Posteromedial popliteal cyst reported as measuring 4.7 x 3.8 x a 0.1 cm.  ACL graft is torn.  Degenerative tears medial lateral meniscus.

## 2022-06-29 ENCOUNTER — APPOINTMENT (OUTPATIENT)
Dept: ORTHOPEDIC SURGERY | Facility: CLINIC | Age: 67
End: 2022-06-29

## 2022-06-29 VITALS — WEIGHT: 176 LBS | BODY MASS INDEX: 27.62 KG/M2 | HEIGHT: 67 IN

## 2022-06-29 DIAGNOSIS — S63.619A UNSPECIFIED SPRAIN OF UNSPECIFIED FINGER, INITIAL ENCOUNTER: ICD-10-CM

## 2022-06-29 DIAGNOSIS — S63.637A SPRAIN OF INTERPHALANGEAL JOINT OF LEFT LITTLE FINGER, INITIAL ENCOUNTER: ICD-10-CM

## 2022-06-29 PROCEDURE — 73140 X-RAY EXAM OF FINGER(S): CPT

## 2022-06-29 NOTE — HISTORY OF PRESENT ILLNESS
[Sudden] : sudden [5] : 5 [0] : 0 [Dull/Aching] : dull/aching [Intermittent] : intermittent [Rest] : rest [Full time] : Work status: full time [de-identified] : Date of injury June 18, 2022.  Patient is a 66-year-old right-hand-dominant female who jammed her left fifth finger when she was hiking.  She does not recall a pop or snap.  Since that time she has moderate pain and stiffness in her finger with movement.  Difficulty making a closed fist.  Pain is mostly around her PIP joint [] : Post Surgical Visit: no [de-identified] : GRIPPING/BENDING [de-identified] : Perla

## 2022-06-29 NOTE — DISCUSSION/SUMMARY
[de-identified] : The patient will zack tape fourth and fifth fingers when out of the house for protection\par She was instructed in range of motion exercises to do on a regular basis\par Ice p.r.n.\par Tylenol p.r.n.\par She will avoid irritating activities\par \par Impression:\par Sprain left fifth finger June 18, 2022\par Osteoarthritis left fifth finger

## 2022-06-29 NOTE — IMAGING
[de-identified] : Left hand\par There is mild diffuse swelling of the left fifth finger compared to the right side\par Minimal tenderness over the DIP joint.  Moderate tenderness PIP joint\par No tenderness over the MP joint\par The patient has difficulty making a clenched fist secondary to pain and swelling in her fifth finger\par Rotational alignment of the finger is normal\par Sensation intact\par Ligaments are stable\par Radial pulse 2+ [Left] : left fingers [FreeTextEntry1] : Reviewed interpreted.  Left fifth finger AP, lateral and obliques-moderate to severe degenerative changes of the DIP joint.  Moderate degenerative changes of the PIP joint.  No fractures are noted

## 2022-07-14 ENCOUNTER — APPOINTMENT (OUTPATIENT)
Dept: ORTHOPEDIC SURGERY | Facility: CLINIC | Age: 67
End: 2022-07-14

## 2022-07-14 VITALS — BODY MASS INDEX: 27.62 KG/M2 | HEIGHT: 67 IN | WEIGHT: 176 LBS

## 2022-07-14 DIAGNOSIS — S63.637D SPRAIN OF INTERPHALANGEAL JOINT OF LEFT LITTLE FINGER, SUBSEQUENT ENCOUNTER: ICD-10-CM

## 2022-07-14 DIAGNOSIS — M19.042 PRIMARY OSTEOARTHRITIS, LEFT HAND: ICD-10-CM

## 2022-07-14 PROCEDURE — 99213 OFFICE O/P EST LOW 20 MIN: CPT

## 2022-07-14 PROCEDURE — 73140 X-RAY EXAM OF FINGER(S): CPT | Mod: LT

## 2022-07-14 NOTE — HISTORY OF PRESENT ILLNESS
[Gradual] : gradual [5] : 5 [1] : 2 [Dull/Aching] : dull/aching [Intermittent] : intermittent [Rest] : rest [Full time] : Work status: full time [de-identified] : Date of injury June 18, 2022\par The patient is almost 1 month status post injury left fifth finger.  She has mild pain and stiffness.  Difficulty making a close fist.  She has been doing home exercises.  Keeping her fingers zack taped p.r.n. [] : Post Surgical Visit: no [de-identified] : Perla

## 2022-07-14 NOTE — DISCUSSION/SUMMARY
[de-identified] : The patient will continue zack tape fourth and fifth fingers when out of the house for protection.  I cautioned her regarding risk of injury and if there is a nondisplaced fracture, there may be weakness resulting in fracture displacement which would require ORIF\par Continue range of motion exercises on a regular basis\par I did explain to her that it may take several months for the stiffness and swelling to subside\par Ice p.r.n.\par Tylenol p.r.n.\par She will avoid irritating activities\par She will return in 2 weeks for followup in [facility] LEFT FIFTH FINGER\par \par Impression:\par Sprain left fifth finger June 18, 2022\par Osteoarthritis left fifth finger, possible nondisplaced fracture proximal phalanx neck 95.3

## 2022-07-26 NOTE — ASU DISCHARGE PLAN (ADULT/PEDIATRIC). - I HAVE READ AND UNDERSTAND THE ABOVE INSTRUCTIONS AND I UNDERSTAND IT IS IMPORTANT TO FOLLOW THESE INSTRUCTIONS
[de-identified] : Patient referred by Dr. Nelson for evaluation of primary hyperparathyroidism. Patient reports over a 4 year history of elevated calcium.  Patient reports hypertension, reflux, fatigue and worsening osteopenia. Denies kidney stones, increased thirst, fracture, bone pain, dysphagia, change in voice or radiation exposure. TSH 1.2, calcium 10.6, , vitamin D 41. Bone density August 2019: Spine T. -0.4, hip and -2.4, wrist T. -2.0. Patient reports history of thyroid nodules, biopsied 3 years ago benign. Followup ultrasound May 2019 stable. Results not available.\par 8/12/20202 left superior and right inferior parathyroidectomy. denies dysphagia, hoarseness or parathesias. pathology hypercellular parathyroid. currently on calcium 1200 and vit D 600 daily.  thyroid US 1/16/21 with [facility], no comparison with largest 1.5 cm. f/u US 6/2021 and 6/2022  stable nodules.  biopsy 2014 reports reviewed.   continues on 2 calcium and vit D daily .  denies recent illness.  I have reviewed all old and new data and available images.  
Statement Selected

## 2022-07-28 ENCOUNTER — APPOINTMENT (OUTPATIENT)
Dept: ORTHOPEDIC SURGERY | Facility: CLINIC | Age: 67
End: 2022-07-28

## 2022-12-19 NOTE — ASU PREOP CHECKLIST - INTERNAL PROSTHESES
yes(specify)/Left knee hardware Topical Retinoid counseling:  Patient advised to apply a pea-sized amount only at bedtime and wait 30 minutes after washing their face before applying.  If too drying, patient may add a non-comedogenic moisturizer. The patient verbalized understanding of the proper use and possible adverse effects of retinoids.  All of the patient's questions and concerns were addressed.

## 2022-12-29 NOTE — ASU PREOP CHECKLIST - BSA (M2)
GI CONSULTATION NOTE  Samir Grover NP  [phone] NP in-hospital cell phone M-F until 4:30  After 5pm or on weekends, please call  for physician on call    NAME: Jovi Kirkpatrick   :  1944   MRN:  [id number]   Attending:  Dr. Salvador Engle  Primary GI:  Dr. Cathi Pro   Date/Time:  2022 9:43 AM  Assessment:   Acute on chronic anemia   FOBT +  Hgb 4.2  Gastric bx from 2022 suggestive of portal hypertensive gastropathy and small intestinal AVMS, could consider TIPS if fails conservative measures  Hx cirrhosis secondary to BRADY, compensated   Had liver bx at [facility]   BRADY, moderate fibrosis with bridging, stage III  Platelets 55     GI History:  2022 EGD: normal esophagus, edematous appearance of stomach, antrum with oozing foci and areas of ulceration and inflammation. Bx portal hypertensive gastropathy vs GAVE. Two small nonbleeding AVM in distal duodenum and jejunum s/p APC. 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 Gabriel Galo; obtain consent  Details and risks of the procedure to include (but not limited to) anesthesia, bleeding, infection, and perforation were discussed.  Patient understands and is in agreement with the plan  NPO after midnight; GI lite diet for now  Serial H&H; goal for hgb >7.0  Monitor for s/s of bleeding; transfuse as clinically indicated  Continue PPI  Abdominal US to assess liver   PT/INR ordered   Symptomatic care per primary team  Plan discussed with Dr. Gabriel Galo    Subjective:     HISTORY OF PRESENT ILLNESS:     Nella Krabbe is an 69 y.o.  female who we are asked to see for complaint of acute on chronic anemia. Patient presented to the ED with complaints of SOB. She notes she became having short of breath yesterday and worsening today. She notes this is the marker for when she is anemic. Notably she has a long history of anemia and has received over 14 units of blood in the past for chronic GI bleeds. She does follow-up with GI as noted to have GAVE. She notes that there is nothing to do for this at her last GI appointment note she would just have chronic bleeding all the time. She does get iron infusions follows up with nephrology for mild CKD. She was getting an iron infusion yesterday in the outpatient labs and she was noted to have a hemoglobin of 6 and referred to the ER for evaluation. She does get short of breath when she stands up as well as dizziness. She denies any blood in her stool. Is been eating and drinking normally but occasionally has some dry heaving and gagging but not actively vomiting.      Past Medical History:   Diagnosis Date    Abnormal nuclear stress test 10/04/2021    Anemia     Angina at rest [facility]) 10/04/2021    Arthritis     KNEE,gout    Bundle branch block     Endocrine disease     HYPOTHYROIDISM    Fracture     Left distal femur (age 13 - pt fell)    Fracture     Left tibia 1990 (pt fell)    Fracture     Right wrist fractured 2 times (pt fell)    GERD (gastroesophageal reflux disease)     High cholesterol     Hypertension     Hypoglycemia     \"my body produces too much insulin\"    Liver disease     BRADY    Nausea & vomiting     when had gallbladder out    Osteoporosis     Other ill-defined conditions(799.89)     edema legs    S/P cardiac cath 10/04/2021    10/4/2021 nonobstructive disease    Spinal stenosis     Thyroid disease       Past Surgical History:   Procedure Laterality Date    COLONOSCOPY N/A 7/13/2021    COLONOSCOPY performed by Yaya Hutchinson, MD Der[facility] at [facility] ENDOSCOPY    COLONOSCOPY N/A 8/8/2022    COLONOSCOPY performed by Jo Ann Landis MD at [place] [address] [address]  2/11/2015         COLONOSCOPY,REMV LESN,SNARE  7/13/2021         COLONOSCPY,FLEX,W/ [address] INJECT  7/13/2021         COLORECTAL SCRN; HI RISK IND  2/11/2015         HX CHOLECYSTECTOMY      HX HYSTERECTOMY      HX ORTHOPAEDIC Left     leg surgery - plates, screws, wires, pins in place    HX UROLOGICAL      PESSURY    MN ABDOMEN SURGERY PROC UNLISTED      liver biopsy--BRADY and fibrosis    SB ENDOSCOPY,W/CONTROL,BLEEDING  11/16/2022    SMALL BOWEL ENDOSCOPY,ABLATE LESN  11/16/2022    SMALL BOWEL ENDOSCOPY,BIOPSY  11/16/2022    UPPER GI ENDOSCOPY,BIOPSY  11/17/2015          Social History     Tobacco Use    Smoking status: Never    Smokeless tobacco: Never   Substance Use Topics    Alcohol use: No      Family History   Problem Relation Age of Onset    Diabetes Mother     Heart Disease Mother     Emphysema Father     No Known Problems Brother     Stroke Maternal Grandmother     No Known Problems Maternal Grandfather     No Known Problems Paternal Grandmother     No Known Problems Paternal Grandfather       Allergies   Allergen Reactions    Gabapentin Other (comments)     Suicidal ideation    Metoprolol Rash    Celebrex [Celecoxib] Hives    Eliquis [Apixaban] Other (comments)     Caused excessive anemia    Pcn [Penicillins] Unknown (comments)     Can't remember, was a long time    Sulfa (Sulfonamide Antibiotics) Hives      Prior to Admission medications    Medication Sig Start Date End Date Taking? Authorizing Provider   allopurinoL (ZYLOPRIM) 100 mg tablet Take 100 mg by mouth daily.    Yes Provider, Historical   atorvastatin (LIPITOR) 20 mg tablet TAKE 1 TABLET BY MOUTH AT BEDTIME 12/19/22  Yes Rene Colón MD   ezetimibe (ZETIA) 10 mg tablet TAKE 1 TABLET BY MOUTH EVERY DAY 12/19/22  Yes Rene Colón MD   furosemide (LASIX) 80 mg tablet Take 0.5 Tablets by mouth daily. 11/18/22  Yes Leary Essex, NP   pantoprazole (PROTONIX) 40 mg tablet Take 1 Tablet by mouth Before breakfast and dinner. 11/18/22  Yes Leary Essex, NP   metOLazone (ZAROXOLYN) 2.5 mg tablet Take 1 Tablet by mouth every other day. 10/18/22  Yes Brenda Jerry MD   amLODIPine (NORVASC) 10 mg tablet Take 1 Tablet by mouth daily. 9/24/22  Yes Brenda Jerry MD   pantoprazole (PROTONIX) 40 mg tablet Take 1 Tablet by mouth Before breakfast and dinner. 8/17/22  Yes Brenda Jerry MD   nystatin (MYCOSTATIN) powder Apply  to affected area two (2) times a day. 8/8/22  Yes Nadine Brink MD   fluticasone propionate [facility]) 50 mcg/actuation nasal spray 2 Sprays by Both Nostrils route daily. Administer to right and left nostril. 6/24/22  Yes Brenda Jerry MD   levothyroxine (SYNTHROID) 150 mcg tablet Take 1 Tablet by mouth Daily (before breakfast). 6/15/22  Yes Brenda Jerry MD   lidocaine (LIDODERM) 5 % Apply patch to the affected area for 12 hours a day and remove for 12 hours a day. 12/7/21  Yes Brenda Jerry MD   ketoconazole (NIZORAL) 2 % topical cream Apply  to affected area daily as needed. 8/23/19  Yes Provider, Historical   Biotin 2,500 mcg cap Take  by mouth. Yes Provider, Historical   loratadine (CLARITIN) 10 mg tablet TAKE 1 TABLET BY MOUTH EVERY DAY 12/23/22   Brenda Jerry MD   benzocaine-menthoL (CHLORASEPTIC MAX) 15-10 mg lozg lozenge Take 1 Lozenge by mouth as needed for Sore throat. 11/18/22   Leary Essex, NP   levalbuterol tartrate [facility]) 45 mcg/actuation inhaler Take 2 Puffs by inhalation every six (6) hours as needed for Wheezing or Shortness of Breath. Provider, Historical   alendronate (FOSAMAX) 70 mg tablet alendronate 70 mg tablet   Take 1 tablet every week by oral route. Provider, Historical   losartan (COZAAR) 25 mg tablet Take 25 mg by mouth daily.  10/18/22   Provider, Historical   potassium chloride SA (MICRO-K) 10 mEq capsule Take 2 Capsules by mouth daily. 9/24/22   Chicho Thompson MD   icosapent ethyL (VASCEPA) 1 gram capsule Take 2 Capsules by mouth two (2) times daily (with meals). 2/14/22   Chicho Thompson MD   polyethylene glycol [facility]) 17 gram/dose powder Take 17 g by mouth daily. 4/6/20   Chicho Thompson MD   L GASSERI/B BIFIDUM/B LONGUM ([employer] PO) Take 1 Tab by mouth daily. Provider, Historical   vitamin E (AQUA GEMS) 400 unit capsule Take 800 Units by mouth two (2) times a day. Provider, Historical   cholecalciferol, vitamin D3, 50 mcg (2,000 unit) tab Take 1 Tab by mouth daily. Other, MD Janette   MULTIVITAMIN WITH MINERALS (ONE-A-DAY 50 PLUS PO) Take 1 Tab by mouth daily.     Provider, Historical       Patient Active Problem List   Diagnosis Code    Gout M10.9    Hypothyroidism E03.9    Osteoporosis M81.0    Anxiety F41.9    Leg edema R60.0    RSD lower limb G90.529    Fatty liver K76.0    Pure hypercholesterolemia E78.00    Colon polyp K63.5    Bifascicular block I45.2    HTN (hypertension) I10    Thrombocytopenia (HCC) D69.6    Prediabetes R73.03    DDD (degenerative disc disease), lumbar M51.36    Controlled type 2 diabetes mellitus without complication (HCC) S10.8    Severe obesity (HCC) E66.01    Cellulitis of left lower leg L03.116    MICHELLE (acute kidney injury) ([facility] [address] N17.9    Nonrheumatic aortic valve stenosis I35.0    Pulmonary hypertension (HCC) I27.20    Abnormal nuclear stress test R94.39    Angina at rest [facility]) I20.8    S/P cardiac cath Z98.890    Type 2 diabetes mellitus with chronic kidney disease (HCC) E11.22    Stage 3a chronic kidney disease (HCC) N18.31    PAF (paroxysmal atrial fibrillation) (HCC) I48.0    SSS (sick sinus syndrome) (HCC) I49.5    Anemia D64.9    Acute anemia D64.9    Iron deficiency anemia due to chronic blood loss D50.0    Acute blood loss anemia D62    Blood loss anemia D50.0    GAVE (gastric antral vascular ectasia) K31.819       REVIEW OF SYSTEMS:    Constitutional: negative fever, negative chills, negative weight loss  Eyes:   negative visual changes  ENT:   negative sore throat, tongue or lip swelling   Respiratory:  negative cough, negative dyspnea  Cards:  negative for chest pain, palpitations, lower extremity edema  GI:   See HPI  :  negative for frequency, dysuria  Integument:  negative for rash and pruritus  Heme:  negative for easy bruising and gum/nose bleeding  Musculoskel: negative for myalgias,  back pain and muscle weakness  Neuro: negative for headaches, dizziness, vertigo  Psych: negative for feelings of anxiety, depression     Objective:   VITALS:    Visit Vitals  BP 97/65   Pulse 63   Temp 98.3 °F (36.8 °C)   Resp 18   Ht 4' 11\" (1.499 m)   Wt 93 kg (205 lb)   SpO2 95%   BMI 41.40 kg/m²       PHYSICAL EXAM:   General:          Pleasant elderly  female. NAD    Head:               Normocephalic, without obvious abnormality, atraumatic. Eyes:               Conjunctivae clear and pale, anicteric sclerae. Pupils are equal  Nose:               Nares normal. No drainage or sinus tenderness. Throat:             Lips, mucosa, and tongue normal.  No Thrush  Neck:               Supple, symmetrical,  no adenopathy, thyroid: non tender  Back:               Symmetric,  No CVA tenderness. Lungs:             CTA bilaterally. No wheezing/rhonchi/rales. Chest wall:      No tenderness or deformity. No Accessory muscle use. Heart:              Regular rate and rhythm,  no murmur, rub or gallop. Abdomen:        Soft, non-tender. Not distended. Bowel sounds normal. No masses  Extremities:     Atraumatic, No cyanosis. No edema. No clubbing  Skin:                Texture, turgor normal. No rashes/lesions/jaundice  Psych:             Good insight. Not depressed. Not anxious or agitated. Neurologic:      EOMs intact. No facial asymmetry. No aphasia or slurred speech. A/O X 3.      LAB DATA REVIEWED:    Recent Results (from the past 24 hour(s))   TYPE & SCREEN    Collection Time: 12/28/22  4:46 PM   Result Value Ref Range    Crossmatch Expiration 12/31/2022,2359     ABO/Rh(D) O POSITIVE     Antibody screen POS     Antibody ID NO ADDITIONAL ANTIBODIES DETECTED     Comment       Previously identified Anti Jk(b) and Warm Autoantibodies    TRUMAN Poly POS     TRUMAN IgG POS     ANTIBODY ELUTED ALL CELLS POSITIVE-ELUATE,     TRUMAN C3b/C3d POS     Unit number [id number]     Blood component type RC LR     Unit division 00     Status of unit ALLOCATED     Crossmatch result LEAST INCOMPATIBLE     ANTIGEN/ANTIBODY INFO C NEGATIVE,  THERON NEGATIVE,  Jk(B) NEGATIVE,       Unit number [id number]     Blood component type RC LR,1     Unit division 00     Status of unit ALLOCATED     Crossmatch result LEAST INCOMPATIBLE     ANTIGEN/ANTIBODY INFO C NEGATIVE,  THERON NEGATIVE,  Jk(B) NEGATIVE,       Unit number [id number]     Blood component type RC LR,2     Unit division 00     Status of unit ISSUED     Crossmatch result LEAST INCOMPATIBLE     ANTIGEN/ANTIBODY INFO C NEGATIVE,  THERON NEGATIVE,  Jk(B) NEGATIVE,      CBC WITH AUTOMATED DIFF    Collection Time: 12/28/22  4:46 PM   Result Value Ref Range    WBC 3.2 (L) 3.6 - 11.0 K/uL    RBC 1.73 (L) 3.80 - 5.20 M/uL    HGB 4.8 (LL) 11.5 - 16.0 g/dL    HCT 15.6 (LL) 35.0 - 47.0 %    MCV 90.2 80.0 - 99.0 FL    MCH 27.7 26.0 - 34.0 PG    MCHC 30.8 30.0 - 36.5 g/dL    RDW 22.0 (H) 11.5 - 14.5 %    PLATELET 68 (L) 844 - 400 K/uL    MPV 11.4 8.9 - 12.9 FL    NRBC 0.0 0  WBC    ABSOLUTE NRBC 0.00 0.00 - 0.01 K/uL    NEUTROPHILS 72 32 - 75 %    LYMPHOCYTES 11 (L) 12 - 49 %    MONOCYTES 10 5 - 13 %    EOSINOPHILS 6 0 - 7 %    BASOPHILS 0 0 - 1 %    IMMATURE GRANULOCYTES 1 (H) 0.0 - 0.5 %    ABS. NEUTROPHILS 2.3 1.8 - 8.0 K/UL    ABS. LYMPHOCYTES 0.4 (L) 0.8 - 3.5 K/UL    ABS. MONOCYTES 0.3 0.0 - 1.0 K/UL    ABS. EOSINOPHILS 0.2 0.0 - 0.4 K/UL    ABS. BASOPHILS 0.0 0.0 - 0.1 K/UL    ABS. IMM.  GRANS. 0.0 0.00 - 0.04 K/UL    DF SMEAR SCANNED      RBC COMMENTS ANISOCYTOSIS  2+        RBC COMMENTS OVALOCYTES  2+        RBC COMMENTS HYPOCHROMIA  1+        RBC COMMENTS MICROCYTOSIS  2+       METABOLIC PANEL, COMPREHENSIVE    Collection Time: 12/28/22  4:46 PM   Result Value Ref Range    Sodium 136 136 - 145 mmol/L    Potassium 4.8 3.5 - 5.1 mmol/L    Chloride 110 (H) 97 - 108 mmol/L    CO2 22 21 - 32 mmol/L    Anion gap 4 (L) 5 - 15 mmol/L    Glucose 142 (H) 65 - 100 mg/dL    BUN 38 (H) 6 - 20 MG/DL    Creatinine 1.49 (H) 0.55 - 1.02 MG/DL    BUN/Creatinine ratio 26 (H) 12 - 20      eGFR 36 (L) >60 ml/min/1.73m2    Calcium 8.9 8.5 - 10.1 MG/DL    Bilirubin, total 1.1 (H) 0.2 - 1.0 MG/DL    ALT (SGPT) 22 12 - 78 U/L    AST (SGOT) 30 15 - 37 U/L    Alk. phosphatase 219 (H) 45 - 117 U/L    Protein, total 6.6 6.4 - 8.2 g/dL    Albumin 3.0 (L) 3.5 - 5.0 g/dL    Globulin 3.6 2.0 - 4.0 g/dL    A-G Ratio 0.8 (L) 1.1 - 2.2     RBC, ALLOCATE    Collection Time: 12/28/22  6:45 PM   Result Value Ref Range    HISTORY CHECKED?  Historical check performed    RETICULOCYTE COUNT    Collection Time: 12/28/22  6:47 PM   Result Value Ref Range    Reticulocyte count 0.2 (L) 0.7 - 2.1 %    Absolute Retic Cnt. 0.0029 (L) 0.0164 - 0.0776 M/ul   VITAMIN B12    Collection Time: 12/28/22  8:06 PM   Result Value Ref Range    Vitamin B12 777 193 - 986 pg/mL   FOLATE    Collection Time: 12/28/22  8:06 PM   Result Value Ref Range    Folate 18.5 5.0 - 21.0 ng/mL   CBC W/O DIFF    Collection Time: 12/29/22 12:07 AM   Result Value Ref Range    WBC 2.5 (L) 3.6 - 11.0 K/uL    RBC 1.53 (L) 3.80 - 5.20 M/uL    HGB 4.2 (LL) 11.5 - 16.0 g/dL    HCT 13.9 (LL) 35.0 - 47.0 %    MCV 90.8 80.0 - 99.0 FL    MCH 27.5 26.0 - 34.0 PG    MCHC 30.2 30.0 - 36.5 g/dL    RDW 21.8 (H) 11.5 - 14.5 %    PLATELET 55 (L) 302 - 400 K/uL    MPV 11.3 8.9 - 12.9 FL    NRBC 0.0 0  WBC    ABSOLUTE NRBC 0.00 0.00 - 3.66 K/uL   METABOLIC PANEL, COMPREHENSIVE    Collection Time: 12/29/22 12:07 AM   Result Value Ref Range    Sodium 138 136 - 145 mmol/L    Potassium 4.4 3.5 - 5.1 mmol/L    Chloride 112 (H) 97 - 108 mmol/L    CO2 21 21 - 32 mmol/L    Anion gap 5 5 - 15 mmol/L    Glucose 121 (H) 65 - 100 mg/dL    BUN 37 (H) 6 - 20 MG/DL    Creatinine 1.40 (H) 0.55 - 1.02 MG/DL    BUN/Creatinine ratio 26 (H) 12 - 20      eGFR 39 (L) >60 ml/min/1.73m2    Calcium 8.9 8.5 - 10.1 MG/DL    Bilirubin, total 1.1 (H) 0.2 - 1.0 MG/DL    ALT (SGPT) 19 12 - 78 U/L    AST (SGOT) 20 15 - 37 U/L    Alk. phosphatase 189 (H) 45 - 117 U/L    Protein, total 6.0 (L) 6.4 - 8.2 g/dL    Albumin 2.6 (L) 3.5 - 5.0 g/dL    Globulin 3.4 2.0 - 4.0 g/dL    A-G Ratio 0.8 (L) 1.1 - 2.2     RBC, ALLOCATE    Collection Time: 12/29/22  8:45 AM   Result Value Ref Range    HISTORY CHECKED?  Historical check performed        IMAGING RESULTS:  I have personally reviewed the imaging reports      Total time spent with patient: 25 minutes ________________________________________________________________________  Care Plan discussed with:  Patient x   Family     RN x              Consultant:       CT  12/29/2022:  ________________________________________________________________________    ___________________________________________________  Consulting Provider: Marbella Martines NP      12/29/2022  9:43 AM 2

## 2023-03-24 ENCOUNTER — OFFICE (OUTPATIENT)
Dept: URBAN - METROPOLITAN AREA CLINIC 70 | Facility: CLINIC | Age: 68
Setting detail: OPHTHALMOLOGY
End: 2023-03-24
Payer: MEDICARE

## 2023-03-24 DIAGNOSIS — H16.222: ICD-10-CM

## 2023-03-24 DIAGNOSIS — H25.13: ICD-10-CM

## 2023-03-24 DIAGNOSIS — H16.221: ICD-10-CM

## 2023-03-24 DIAGNOSIS — H16.223: ICD-10-CM

## 2023-03-24 PROCEDURE — 92014 COMPRE OPH EXAM EST PT 1/>: CPT | Performed by: STUDENT IN AN ORGANIZED HEALTH CARE EDUCATION/TRAINING PROGRAM

## 2023-03-24 PROCEDURE — 83861 MICROFLUID ANALY TEARS: CPT | Performed by: STUDENT IN AN ORGANIZED HEALTH CARE EDUCATION/TRAINING PROGRAM

## 2023-03-24 ASSESSMENT — REFRACTION_AUTOREFRACTION
OS_AXIS: 095
OS_SPHERE: +0.50
OD_AXIS: 118
OD_SPHERE: +0.50
OS_CYLINDER: -0.25
OD_CYLINDER: -0.25

## 2023-03-24 ASSESSMENT — CONFRONTATIONAL VISUAL FIELD TEST (CVF)
OD_FINDINGS: FULL
OS_FINDINGS: FULL

## 2023-03-24 ASSESSMENT — AXIALLENGTH_DERIVED
OD_AL: 23.2221
OS_AL: 23.2221

## 2023-03-24 ASSESSMENT — SPHEQUIV_DERIVED
OD_SPHEQUIV: 0.375
OS_SPHEQUIV: 0.375

## 2023-03-24 ASSESSMENT — KERATOMETRY
OS_AXISANGLE_DEGREES: 055
OD_AXISANGLE_DEGREES: 071
OS_K2POWER_DIOPTERS: 44.25
OD_K2POWER_DIOPTERS: 44.25
OD_K1POWER_DIOPTERS: 44.00
OS_K1POWER_DIOPTERS: 44.00

## 2023-03-24 ASSESSMENT — SUPERFICIAL PUNCTATE KERATITIS (SPK)
OS_SPK: 1+ 2+
OD_SPK: 1+ 2+

## 2023-03-24 ASSESSMENT — VISUAL ACUITY
OS_BCVA: 20/20-1
OD_BCVA: 20/20-1

## 2023-10-25 NOTE — H&P PST ADULT - FAMILY HISTORY
Vaginal Leakage None Mother  Still living? Yes, Estimated age: 88  Family history of dementia, Age at diagnosis: Age Unknown  Family history of emphysema, Age at diagnosis: Age Unknown  Family history of arthritis, Age at diagnosis: Age Unknown     Father  Still living? Yes, Estimated age: 89  Family history of acute congestive heart failure, Age at diagnosis: Age Unknown  Family history of type 2 diabetes mellitus, Age at diagnosis: Age Unknown  Family history of prostate cancer in father, Age at diagnosis: Age Unknown  Family history of neuropathy, Age at diagnosis: Age Unknown     Sibling  Still living? Yes, Estimated age: Age Unknown  Family history of neuropathy, Age at diagnosis: Age Unknown

## 2024-03-29 ENCOUNTER — OFFICE (OUTPATIENT)
Dept: URBAN - METROPOLITAN AREA CLINIC 70 | Facility: CLINIC | Age: 69
Setting detail: OPHTHALMOLOGY
End: 2024-03-29
Payer: MEDICARE

## 2024-03-29 DIAGNOSIS — H40.013: ICD-10-CM

## 2024-03-29 DIAGNOSIS — H16.223: ICD-10-CM

## 2024-03-29 DIAGNOSIS — H25.13: ICD-10-CM

## 2024-03-29 PROCEDURE — 92014 COMPRE OPH EXAM EST PT 1/>: CPT | Performed by: STUDENT IN AN ORGANIZED HEALTH CARE EDUCATION/TRAINING PROGRAM

## 2024-03-29 PROCEDURE — 92250 FUNDUS PHOTOGRAPHY W/I&R: CPT | Performed by: STUDENT IN AN ORGANIZED HEALTH CARE EDUCATION/TRAINING PROGRAM

## 2025-03-25 NOTE — ASU PREOP CHECKLIST - PATIENT PROBLEMS/NEEDS
March 25, 2025     Patient: Valdemar Harmon  YOB: 2012  Date of Visit: 3/25/2025      To Whom it May Concern:    Valdemar Harmon is under my professional care. Valdemar was seen in my office on 3/25/2025. Valdemar may return to school on 3/25/2025 .    If you have any questions or concerns, please don't hesitate to call.         Sincerely,          Alanna Osuna MD           Patient expressed no known problems or needs

## 2025-04-04 ENCOUNTER — OFFICE (OUTPATIENT)
Dept: URBAN - METROPOLITAN AREA CLINIC 70 | Facility: CLINIC | Age: 70
Setting detail: OPHTHALMOLOGY
End: 2025-04-04
Payer: MEDICARE

## 2025-04-04 DIAGNOSIS — H25.13: ICD-10-CM

## 2025-04-04 DIAGNOSIS — H40.013: ICD-10-CM

## 2025-04-04 DIAGNOSIS — H16.223: ICD-10-CM

## 2025-04-04 PROCEDURE — 92133 CPTRZD OPH DX IMG PST SGM ON: CPT | Performed by: STUDENT IN AN ORGANIZED HEALTH CARE EDUCATION/TRAINING PROGRAM

## 2025-04-04 PROCEDURE — 92014 COMPRE OPH EXAM EST PT 1/>: CPT | Performed by: STUDENT IN AN ORGANIZED HEALTH CARE EDUCATION/TRAINING PROGRAM

## 2025-04-04 ASSESSMENT — KERATOMETRY
OS_AXISANGLE_DEGREES: 074
OS_K2POWER_DIOPTERS: 44.25
OD_K1POWER_DIOPTERS: 44.00
OD_AXISANGLE_DEGREES: 061
OS_K1POWER_DIOPTERS: 44.00
OD_K2POWER_DIOPTERS: 44.25

## 2025-04-04 ASSESSMENT — REFRACTION_AUTOREFRACTION
OS_CYLINDER: -0.25
OS_SPHERE: +0.50
OD_CYLINDER: -0.25
OD_SPHERE: +0.50
OS_AXIS: 093
OD_AXIS: 111

## 2025-04-04 ASSESSMENT — VISUAL ACUITY
OD_BCVA: 20/20-1
OS_BCVA: 20/20

## 2025-04-04 ASSESSMENT — CONFRONTATIONAL VISUAL FIELD TEST (CVF)
OS_FINDINGS: FULL
OD_FINDINGS: FULL

## 2025-04-04 ASSESSMENT — SUPERFICIAL PUNCTATE KERATITIS (SPK)
OD_SPK: 1+
OS_SPK: 1+

## 2025-04-04 ASSESSMENT — TONOMETRY
OD_IOP_MMHG: 18
OS_IOP_MMHG: 20